# Patient Record
Sex: MALE | Race: WHITE | NOT HISPANIC OR LATINO | URBAN - METROPOLITAN AREA
[De-identification: names, ages, dates, MRNs, and addresses within clinical notes are randomized per-mention and may not be internally consistent; named-entity substitution may affect disease eponyms.]

---

## 2024-09-04 PROBLEM — E78.2 MIXED HYPERLIPIDEMIA: Status: ACTIVE | Noted: 2024-09-04

## 2024-09-04 PROBLEM — E11.628 TYPE 2 DIABETES MELLITUS WITH SKIN COMPLICATION: Status: ACTIVE | Noted: 2024-09-04

## 2024-09-04 PROBLEM — E11.42 TYPE 2 DIABETES MELLITUS WITH DIABETIC POLYNEUROPATHY, WITHOUT LONG-TERM CURRENT USE OF INSULIN: Status: ACTIVE | Noted: 2024-09-04

## 2024-09-04 PROBLEM — E11.22 TYPE 2 DIABETES MELLITUS WITH STAGE 3A CHRONIC KIDNEY DISEASE, WITHOUT LONG-TERM CURRENT USE OF INSULIN: Status: ACTIVE | Noted: 2024-09-04

## 2024-09-04 PROBLEM — N18.31 TYPE 2 DIABETES MELLITUS WITH STAGE 3A CHRONIC KIDNEY DISEASE, WITHOUT LONG-TERM CURRENT USE OF INSULIN: Status: ACTIVE | Noted: 2024-09-04

## 2024-09-09 ENCOUNTER — HOSPITAL ENCOUNTER (EMERGENCY)
Facility: HOSPITAL | Age: 59
Discharge: HOME | End: 2024-09-09
Attending: EMERGENCY MEDICINE
Payer: MEDICARE

## 2024-09-09 VITALS
OXYGEN SATURATION: 97 % | HEIGHT: 73 IN | DIASTOLIC BLOOD PRESSURE: 81 MMHG | SYSTOLIC BLOOD PRESSURE: 142 MMHG | HEART RATE: 53 BPM | WEIGHT: 193.44 LBS | TEMPERATURE: 98 F | RESPIRATION RATE: 16 BRPM | BODY MASS INDEX: 25.64 KG/M2

## 2024-09-09 DIAGNOSIS — S00.33XA NASAL CONTUSION: ICD-10-CM

## 2024-09-09 DIAGNOSIS — M79.631 PAIN IN RIGHT FOREARM: ICD-10-CM

## 2024-09-09 DIAGNOSIS — M54.2 POSTERIOR NECK PAIN: ICD-10-CM

## 2024-09-09 DIAGNOSIS — S52.571A OTHER CLOSED INTRA-ARTICULAR FRACTURE OF DISTAL END OF RIGHT RADIUS, INITIAL ENCOUNTER: Primary | ICD-10-CM

## 2024-09-09 DIAGNOSIS — M25.531 RIGHT WRIST PAIN: ICD-10-CM

## 2024-09-09 PROCEDURE — 25000003 PHARM REV CODE 250: Performed by: NURSE PRACTITIONER

## 2024-09-09 PROCEDURE — 99284 EMERGENCY DEPT VISIT MOD MDM: CPT | Mod: 25

## 2024-09-09 PROCEDURE — 29125 APPL SHORT ARM SPLINT STATIC: CPT | Mod: RT

## 2024-09-09 RX ORDER — ONDANSETRON 4 MG/1
4 TABLET, ORALLY DISINTEGRATING ORAL EVERY 8 HOURS PRN
Qty: 9 TABLET | Refills: 0 | Status: SHIPPED | OUTPATIENT
Start: 2024-09-09 | End: 2024-09-12

## 2024-09-09 RX ORDER — ONDANSETRON 4 MG/1
4 TABLET, ORALLY DISINTEGRATING ORAL
Status: COMPLETED | OUTPATIENT
Start: 2024-09-09 | End: 2024-09-09

## 2024-09-09 RX ORDER — HYDROCODONE BITARTRATE AND ACETAMINOPHEN 5; 325 MG/1; MG/1
1 TABLET ORAL
Status: COMPLETED | OUTPATIENT
Start: 2024-09-09 | End: 2024-09-09

## 2024-09-09 RX ORDER — HYDROCODONE BITARTRATE AND ACETAMINOPHEN 5; 325 MG/1; MG/1
1 TABLET ORAL EVERY 6 HOURS PRN
Qty: 12 TABLET | Refills: 0 | Status: SHIPPED | OUTPATIENT
Start: 2024-09-09

## 2024-09-09 RX ADMIN — HYDROCODONE BITARTRATE AND ACETAMINOPHEN 1 TABLET: 5; 325 TABLET ORAL at 11:09

## 2024-09-09 RX ADMIN — ONDANSETRON 4 MG: 4 TABLET, ORALLY DISINTEGRATING ORAL at 11:09

## 2024-09-09 NOTE — ED PROVIDER NOTES
Encounter Date: 2024       History     Chief Complaint   Patient presents with    Fall     Bassam Woo is a 59 y.o. male with PMH of Arthritis, DMII< hyperlipidemia, hypertension, MI, neuropathy, sleep apnea presenting to the ED for evaluation nasal pain and right wrist pain after accidental fall.  Patient reports that he was taking the trash out when he lost his footing and the garbage can rolled forward, causing him to fall forward into the garbage can. He hit his nose on the lid of the garbage can and then his R forearm became stuck under the lid. He presents with nasal pain and R FA/wrist pain with swelling.  Pain is described as aching, exacerbated by movement, currently rated 5/10 in severity.  He denies that there was any head trauma or LOC.  Denies nosebleed. Denies numbess/tingling to RUE.     The history is provided by the patient.     Review of patient's allergies indicates:   Allergen Reactions    Codeine Nausea And Vomiting and Nausea Only    Glipizide Other (See Comments)     CBG Drops    CBG Drops     CBG Drops     CBG Drops     Past Medical History:   Diagnosis Date    Arthritis     Cancer     Diabetes mellitus, type 2     Hyperlipidemia     Hypertension     Myocardial infarction     Neuropathy     Neuropathy     Sleep apnea      Past Surgical History:   Procedure Laterality Date    CARPAL TUNNEL RELEASE Left     ELBOW ARTHROPLASTY       Family History   Problem Relation Name Age of Onset    Hypertension Mother      Diabetes Mother      Rheum arthritis Mother      Stroke Father      Heart disease Father      Diabetes Sister      Mental illness Sister      Diabetes Brother      Mental illness Brother       Social History     Tobacco Use    Smoking status: Former     Current packs/day: 0.00     Average packs/day: 2.0 packs/day for 5.0 years (10.0 ttl pk-yrs)     Types: Cigarettes     Start date: 3/26/1979     Quit date: 3/26/1984     Years since quittin.4     Passive exposure: Never     Smokeless tobacco: Never   Substance Use Topics    Alcohol use: Yes     Alcohol/week: 1.0 standard drink of alcohol     Types: 1 Shots of liquor per week     Comment: once a month    Drug use: Never     Review of Systems   Constitutional:  Negative for appetite change, chills and fever.   HENT:  Negative for congestion, ear discharge, ear pain, nosebleeds, postnasal drip, rhinorrhea and sore throat.         Nasal pain and swelling    Respiratory:  Negative for cough, chest tightness and shortness of breath.    Cardiovascular:  Negative for chest pain.   Gastrointestinal:  Negative for abdominal distention, abdominal pain and nausea.   Genitourinary:  Negative for dysuria, flank pain, hematuria and urgency.   Musculoskeletal:  Positive for arthralgias (R FA and wrist). Negative for back pain.   Skin:  Negative for rash.   Neurological:  Negative for dizziness, weakness, numbness and headaches.   Hematological:  Does not bruise/bleed easily.       Physical Exam     Initial Vitals [09/09/24 0927]   BP Pulse Resp Temp SpO2   128/73 (!) 57 18 98 °F (36.7 °C) 99 %      MAP       --         Physical Exam    Nursing note and vitals reviewed.  Constitutional: He appears well-developed and well-nourished.   HENT:   Head: Normocephalic and atraumatic.   Nose: Sinus tenderness present.       Eyes: Conjunctivae and EOM are normal. Pupils are equal, round, and reactive to light.   Neck: Neck supple.   Cardiovascular:  Normal rate, regular rhythm, normal heart sounds and intact distal pulses.           Pulmonary/Chest: Breath sounds normal.   Abdominal: Abdomen is soft. Bowel sounds are normal.   Musculoskeletal:      Right forearm: Swelling and tenderness present.      Right wrist: Swelling and tenderness present. Decreased range of motion. Normal pulse.      Cervical back: Neck supple.      Comments: Good distal pulses and capillary refill     Neurological: He is alert and oriented to person, place, and time. He has normal  "strength.   Skin: Skin is warm and dry.   Psychiatric: He has a normal mood and affect. His behavior is normal. Judgment and thought content normal.         ED Course   Splint Application    Date/Time: 2024 2:24 PM    Performed by: Judit Sy NP  Authorized by: Maris Veras MD  Consent Done: Yes  Risks and benefits: risks, benefits and alternatives were discussed  Patient understanding: patient states understanding of the procedure being performed  Patient identity confirmed: , MRN, name and verbally with patient  Time out: Immediately prior to procedure a "time out" was called to verify the correct patient, procedure, equipment, support staff and site/side marked as required.  Location details: right wrist  Splint type: Velcro right wrist splint.  Post-procedure: The splinted body part was neurovascularly unchanged following the procedure.  Patient tolerance: Patient tolerated the procedure well with no immediate complications        Labs Reviewed - No data to display       Imaging Results              CT Cervical Spine Without Contrast (Final result)  Result time 24 12:33:58      Final result by Debbie Jeronimo MD (24 12:33:58)                   Impression:      Multilevel degenerative changes of the cervical spine without fracture or subluxation.      Electronically signed by: Debbie Jeronimo MD  Date:    2024  Time:    12:33               Narrative:    EXAMINATION:  CT CERVICAL SPINE WITHOUT CONTRAST    CLINICAL HISTORY:  abnormal cervical spine xray;    TECHNIQUE:  Low dose axial images, sagittal and coronal reformations were performed though the cervical spine.  Contrast was not administered.    COMPARISON:  2024    FINDINGS:  Incidentally visualized soft tissue structures of the neck have a normal appearance.  The lung apices are clear.  The prevertebral soft tissues appear normal.    Vertebral body alignment and heights are maintained.  Multilevel disc space " narrowing with endplate sclerosis and marginal osteophytosis.  No fracture or subluxation of the cervical spine is seen.                                       X-Ray Cervical Spine AP And Lateral (Final result)  Result time 09/09/24 11:40:12      Final result by Ezra Galeana MD (09/09/24 11:40:12)                   Impression:      Degenerative changes.    While no clear fracture is identified, if there is clinical suspicion and neck pain, CT of cervical spine would be of value for more definitive evaluation of the slight anterolisthesis at C4-5.      Electronically signed by: Ezra Galeana MD  Date:    09/09/2024  Time:    11:40               Narrative:    EXAMINATION:  XR CERVICAL SPINE AP LATERAL    CLINICAL HISTORY:  Cervicalgia    TECHNIQUE:  AP, lateral and open mouth views of the cervical spine were performed.    COMPARISON:  None.    FINDINGS:  X-ray cervical spine demonstrates straightening.  There is mild degenerative change.  Slight anterolisthesis is noted at C4-5 with small osteophytes.  No prevertebral soft tissue swelling is appreciated.  While no fracture is identified, if there is strong clinical suspicion, CT of cervical spine could be of value for closer evaluation of the slight anterolisthesis.                                       X-Ray Hand 3 view Right (Final result)  Result time 09/09/24 10:57:07      Final result by Debbie Jeronimo MD (09/09/24 10:57:07)                   Impression:      As above.      Electronically signed by: Debbie Jeronimo MD  Date:    09/09/2024  Time:    10:57               Narrative:    EXAMINATION:  XR FOREARM RIGHT; XR HAND COMPLETE 3 VIEW RIGHT    CLINICAL HISTORY:  right hand pain;Pain in right forearm    TECHNIQUE:  Two views of the right forearm and three views of the right hand    COMPARISON:  None.    FINDINGS:  There is a displaced intra-articular fracture of the distal radius, with mild comminution, best visualized on the images of the hand.   Adjacent soft tissue swelling is noted.  Questionable nondisplaced fracture involving the waist of the scaphoid versus superimposition of osseous structures.                                       X-Ray Forearm Right (Final result)  Result time 09/09/24 10:57:07      Final result by Debbie Jeronimo MD (09/09/24 10:57:07)                   Impression:      As above.      Electronically signed by: Debbie Jeronimo MD  Date:    09/09/2024  Time:    10:57               Narrative:    EXAMINATION:  XR FOREARM RIGHT; XR HAND COMPLETE 3 VIEW RIGHT    CLINICAL HISTORY:  right hand pain;Pain in right forearm    TECHNIQUE:  Two views of the right forearm and three views of the right hand    COMPARISON:  None.    FINDINGS:  There is a displaced intra-articular fracture of the distal radius, with mild comminution, best visualized on the images of the hand.  Adjacent soft tissue swelling is noted.  Questionable nondisplaced fracture involving the waist of the scaphoid versus superimposition of osseous structures.                                       X-Ray Nasal Bones (Final result)  Result time 09/09/24 10:55:03      Final result by Debbie Jeronimo MD (09/09/24 10:55:03)                   Impression:      As above.      Electronically signed by: Debbie Jeronimo MD  Date:    09/09/2024  Time:    10:55               Narrative:    EXAMINATION:  XR NASAL BONES    CLINICAL HISTORY:  Contusion of nose, initial encounter    TECHNIQUE:  Two views of the nasal bones    COMPARISON:  None    FINDINGS:  No displaced nasal fracture is seen.  Soft tissues appear normal.                                       Medications   HYDROcodone-acetaminophen 5-325 mg per tablet 1 tablet (1 tablet Oral Given 9/9/24 1142)   ondansetron disintegrating tablet 4 mg (4 mg Oral Given 9/9/24 1142)     Medical Decision Making  Evaluation of a 59-year-old male presenting with nose, right wrist and hand pain after accidental fall at home PTA.  He presents with  stable vital signs.  He has mild swelling with bruising to nasal bridge.  No active bleeding.  Cervical spine with mild spinous tenderness, no step-off or deformity.  Right wrist with mild swelling with no obvious, gross deformity.  Distal pulses intact with good capillary refill.     Differential diagnosis includes nasal contusion, fracture, wrist sprain, strain, fracture, cervical strain, fracture    Amount and/or Complexity of Data Reviewed  Radiology: ordered. Decision-making details documented in ED Course.    Risk  Prescription drug management.  Risk Details: Stable for discharge home.  Patient presented with nose pain, neck, and right wrist pain after accidental fall.  Negative imaging of the nasal bones.  Cervical spine x-ray shows degenerative changes, anterolisthesis of C4/5 with recommendations for CT cervical spine.  CT cervical spine completed that showed no acute fracture.  X-ray of the right wrist and hand shows displaced, intra-articular fracture of the distal radius with mild comminution.  Patient placed in velcro wrist splint, remained neuro intact post splint application.  Arm sling applied for comfort.  Norco for pain control.  Patient is scheduled for follow-up with Ortho, Dr. Lionel Singh out at 2:00 p.m. today. Patient/caregiver voices understanding and feels comfortable with discharge plan.      The patient acknowledges that close follow up with medical provider is required. Instructed to follow up with PCP within 2 days. Patient was given specific return precautions. The patient agrees to comply with all instruction and directions given in the ER.                                        Clinical Impression:  Final diagnoses:  [S00.33XA] Nasal contusion  [M79.631] Pain in right forearm  [M25.531] Right wrist pain  [M54.2] Posterior neck pain  [S52.571A] Other closed intra-articular fracture of distal end of right radius, initial encounter (Primary)          ED Disposition Condition     Discharge Stable          ED Prescriptions       Medication Sig Dispense Start Date End Date Auth. Provider    HYDROcodone-acetaminophen (NORCO) 5-325 mg per tablet Take 1 tablet by mouth every 6 (six) hours as needed for Pain. 12 tablet 9/9/2024 -- Judit Sy NP    ondansetron (ZOFRAN-ODT) 4 MG TbDL Take 1 tablet (4 mg total) by mouth every 8 (eight) hours as needed (nausea). 9 tablet 9/9/2024 9/12/2024 Judit Sy NP          Follow-up Information       Follow up With Specialties Details Why Contact Info    Orthopedics, [facility] Go today 2:00 PM [address] [place] [id number]  [phone]      TERRELL Valenzuela II, MD Orthopedic Surgery Go today 2:00 PM [address] [place] [address] ORTHOPEDICS  [facility] [id number]  [phone]               Judit Sy NP  09/09/24 0647

## 2024-09-09 NOTE — ED TRIAGE NOTES
C/o trip and fall this morning pushing the garbage can. Patient reports right forearm/wrist pain and nasal pain. Patient reports hit his nose on the garbage can when he fell.